# Patient Record
Sex: FEMALE | Race: ASIAN | NOT HISPANIC OR LATINO | URBAN - METROPOLITAN AREA
[De-identification: names, ages, dates, MRNs, and addresses within clinical notes are randomized per-mention and may not be internally consistent; named-entity substitution may affect disease eponyms.]

---

## 2024-03-18 ENCOUNTER — EMERGENCY (EMERGENCY)
Facility: HOSPITAL | Age: 39
LOS: 1 days | Discharge: HOME | End: 2024-03-18
Admitting: EMERGENCY MEDICINE
Payer: COMMERCIAL

## 2024-03-18 VITALS
TEMPERATURE: 98 F | RESPIRATION RATE: 18 BRPM | HEART RATE: 93 BPM | SYSTOLIC BLOOD PRESSURE: 147 MMHG | DIASTOLIC BLOOD PRESSURE: 83 MMHG | OXYGEN SATURATION: 100 %

## 2024-03-18 PROCEDURE — 99284 EMERGENCY DEPT VISIT MOD MDM: CPT

## 2024-03-18 NOTE — ED PROVIDER NOTE - PATIENT PORTAL LINK FT
You can access the FollowMyHealth Patient Portal offered by [facility] by registering at the following website: [email or web address]. By joining [facility]’s FollowMyHealth portal, you will also be able to view your health information using other applications (apps) compatible with our system.

## 2024-03-18 NOTE — ED PROVIDER NOTE - OBJECTIVE STATEMENT
39yoF PMH of uterine prolapse presenting with intravaginal pain over past week. states it is worse with prolonged standing and prolonged sitting. has been worse over past week. saw her GYN, and was given a ring pessary however with minimal relief. PT denies any vaginal bleeding, discharge, dysuria, n/v/d/c or fevers. However pt states that she has been bleeding up to 2x during her menstrual cycle.

## 2024-03-18 NOTE — ED ADULT TRIAGE NOTE - CHIEF COMPLAINT QUOTE
Pt c/o pelvic pain X 1 week. Denies bleeding, discharge, chest pain, sob, abd pain, n/v/d, fevers/chills. Seen at OSH and given urology follow up, states does not know why.  Denies Hx

## 2024-03-18 NOTE — ED PROVIDER NOTE - CLINICAL SUMMARY MEDICAL DECISION MAKING FREE TEXT BOX
39yoF PMH of uterine prolapse presenting with intravaginal pain over past week. states it is worse with prolonged standing and prolonged sitting. has been worse over past week. saw her GYN, and was given a ring pessary however with minimal relief. PT denies any vaginal bleeding, discharge, dysuria, n/v/d/c or fevers. However pt states that she has been bleeding up to 2x during her menstrual cycle.     will consult GYN for additional recs as well as evalaution of cervical lesions  no fevers, no c/f PID or ovarian torsion  likely outpatient f/u

## 2024-03-18 NOTE — ED PROVIDER NOTE - PHYSICAL EXAMINATION
CONSTITUTIONAL: Well-appearing; well-nourished; in no apparent distress;  NECK/LYMPH: Supple; non-tender;  CARD: Normal S1, S2; no murmurs, rubs, or gallops noted  RESP: Normal chest excursion with respiration; breath sounds clear and equal bilaterally; no wheezes, rhonchi, or rales noted  ABD/GI: soft, non-distended; non-tender; no palpable organomegaly, no pulsatile mass  PELVIC: performed via speculum exam with Female Chaperone KILO Baxter. no external vulvar lesions/rashes or abnormalities. cervical os closed. + cervical appearing tissue coming thru cervix, with erythematous punch like abrasions/lesions noted to cervix. no active bleeding. no mucopurulent discharge. Bimanual exam: negative CMT, negative adnexal ttp.  EXT/MS: moves all extremities  SKIN: Normal for age and race  NEURO: Awake, alert, oriented x 3, no gross deficits  PSYCH: Normal mood; appropriate affect

## 2024-03-18 NOTE — ED ADULT NURSE NOTE - OBJECTIVE STATEMENT
Patient received in wellness, exam room 4. Patient A&Ox3 and ambulatory at baseline. Patient presents to the ED c/o vaginal discomfort for approximately 4 days. phx uterine prolapse. Patient state she "feels something pulling from her vagina and pelvic area". Patient denies headache, dizziness, nausea/vomiting, fever/chills, and pain. Patient offers no complaints at this time. Respirations even and unlabored, no signs/symptoms of acute distress; patient denies dyspnea, shortness of breath, and chest pain. Patient is stable at this time. Steady gait observed.

## 2024-03-18 NOTE — ED PROVIDER NOTE - PROGRESS NOTE DETAILS
EAN Stratton: received pt in sign out pending OBGYN recs  Discussed with OBGYN, recommending d/c with outpatient follow up  pt aware and in agreement with plan.

## 2024-03-19 VITALS
TEMPERATURE: 98 F | HEART RATE: 76 BPM | SYSTOLIC BLOOD PRESSURE: 126 MMHG | OXYGEN SATURATION: 99 % | DIASTOLIC BLOOD PRESSURE: 92 MMHG | RESPIRATION RATE: 18 BRPM

## 2024-03-19 NOTE — CONSULT NOTE ADULT - SUBJECTIVE AND OBJECTIVE BOX
GYN Consult Note    INCOMPLETE NOTE    39y G_P_ presents with     OB/GYN HISTORY:   Physician:   Ob:   Gyn: Denies fibroids, cysts, PCOS, endometriosis, or history of genital lesions   PMH: Denies    PSH: Denies   Meds:   Allergies:         REVIEW OF SYSTEMS  General: denies fevers, chills, tiredness  Skin/Breast: denies breast pain  Respiratory and Thorax: denies shortness of breath, denies cough  Cardiovascular: denies chest pain and denies palpitations  Gastrointestinal: denies abdominal pain, nausea/ vomiting	  Genitourinary: denies dysuria, increased urinary frequency, urgency	  Constitutional, Cardiovascular, Respiratory, Gastrointestinal, Genitourinary, Musculoskeletal and Integumentary review of systems are normal except as noted. 	      Vital Signs Last 24 Hrs  T(C): 36.8 (19 Mar 2024 00:01), Max: 36.8 (19 Mar 2024 00:01)  T(F): 98.2 (19 Mar 2024 00:01), Max: 98.2 (19 Mar 2024 00:01)  HR: 76 (19 Mar 2024 00:01) (76 - 93)  BP: 126/92 (19 Mar 2024 00:01) (126/92 - 147/83)  BP(mean): 103 (19 Mar 2024 00:01) (103 - 103)  RR: 18 (19 Mar 2024 00:01) (18 - 18)  SpO2: 99% (19 Mar 2024 00:01) (99% - 100%)    Parameters below as of 19 Mar 2024 00:01  Patient On (Oxygen Delivery Method): room air      PHYSICAL EXAM:   Gen: NAD, alert and oriented x 3  Cardiovascular: RRR  Respiratory: breathing comfortably on RA  Abd: soft, non tender, non-distended  Pelvic: closed/long, no CMT, Uterus: normal size, non tender  Adnexa: non tender, no palpable masses  Speculum Exam: No active bleeding from os.  Physiologic discharge.    Extremities: non-tender b/l, no edema   Skin: warm and well perfused  *Pelvic exam performed with chaperone present    LABS:                RADIOLOGY & ADDITIONAL STUDIES: GYN Consult Note    39y  LMP  with hx pelvic organ prolapse with pessary presents with pelvic pain.  Gyn consulted for lesions seen on cervix during ED pelvic exam.  Reports she has had increasing lower abdominal/pelvic pressure for one week after standing for extended periods of time at work.  States she has had a pessary for one year, but does not always wear it.  States she inserted her pessary today, but did not feel relief with placement.  Denies vaginal bleeding, discharge, chest pain, shortness of breath, fevers, chills.     OB/GYN HISTORY:   Physician: Urologist: Leonardo Swift (reports this is who fit her for a pessary), Gyn: [facility] (seen for [facility] appointment this past Friday)  Ob: FT  (2016), MAB w/D&C x1  Gyn: uterine prolapse with pessary in place.  Denies fibroids, cysts, abnormal pap smears, STIs  PMH: Denies    PSH: D&C x1  Meds: Denies  Allergies: NKDA      Vital Signs Last 24 Hrs  T(C): 36.8 (19 Mar 2024 00:01), Max: 36.8 (19 Mar 2024 00:01)  T(F): 98.2 (19 Mar 2024 00:01), Max: 98.2 (19 Mar 2024 00:01)  HR: 76 (19 Mar 2024 00:01) (76 - 93)  BP: 126/92 (19 Mar 2024 00:01) (126/92 - 147/83)  BP(mean): 103 (19 Mar 2024 00:01) (103 - 103)  RR: 18 (19 Mar 2024 00:01) (18 - 18)  SpO2: 99% (19 Mar 2024 00:01) (99% - 100%)    Parameters below as of 19 Mar 2024 00:01  Patient On (Oxygen Delivery Method): room air      PHYSICAL EXAM:   Gen: NAD, alert and oriented x 3  Respiratory: breathing comfortably on RA  Abd: soft, non tender, non-distended  Pelvic: External genitalia wnl.  Cervix closed, no CMT, Uterus: stage 1 prolapse, nontender  Adnexa: non tender, no palpable masses  Speculum Exam: Small area of excoriation on cervix consistent with irritation from pessary placement/removal.  No bleeding from os, physiologic discharge  Extremities: non-tender b/l, no edema   Skin: warm and well perfused  *Pelvic exam performed with chaperone present: KILO Baxter

## 2024-03-19 NOTE — CONSULT NOTE ADULT - ASSESSMENT
INCOMPLETE NOTE 39y  LMP  with hx pelvic organ prolapse with pessary presents with pelvic pain.  Gyn consulted for lesions seen on cervix during ED pelvic exam.  Exam with mild uterine prolapse and excoriation on cervix consistent with irritation from digital placement/removal of pessary.      Recommendations:  - No acute gyn intervention  - Patient to follow up outpatient with OBGYN and Urology regarding POP and pessary management.   - Remainder of care per ED    Discussed with Dr. Crane, Service Attending  ELIESER Oakley PGY2